# Patient Record
Sex: MALE | Race: WHITE | NOT HISPANIC OR LATINO | Employment: FULL TIME | URBAN - METROPOLITAN AREA
[De-identification: names, ages, dates, MRNs, and addresses within clinical notes are randomized per-mention and may not be internally consistent; named-entity substitution may affect disease eponyms.]

---

## 2022-11-28 ENCOUNTER — OFFICE VISIT (OUTPATIENT)
Dept: FAMILY MEDICINE | Facility: CLINIC | Age: 42
End: 2022-11-28
Payer: COMMERCIAL

## 2022-11-28 VITALS
BODY MASS INDEX: 19.59 KG/M2 | HEIGHT: 73 IN | HEART RATE: 83 BPM | DIASTOLIC BLOOD PRESSURE: 75 MMHG | OXYGEN SATURATION: 98 % | TEMPERATURE: 98 F | WEIGHT: 147.81 LBS | RESPIRATION RATE: 15 BRPM | SYSTOLIC BLOOD PRESSURE: 110 MMHG

## 2022-11-28 DIAGNOSIS — Z76.89 ESTABLISHING CARE WITH NEW DOCTOR, ENCOUNTER FOR: Primary | ICD-10-CM

## 2022-11-28 DIAGNOSIS — F43.12 CHRONIC POST-TRAUMATIC STRESS DISORDER (PTSD): ICD-10-CM

## 2022-11-28 DIAGNOSIS — Z28.21 INFLUENZA VACCINATION DECLINED BY PATIENT: ICD-10-CM

## 2022-11-28 DIAGNOSIS — K92.1 HEMATOCHEZIA: ICD-10-CM

## 2022-11-28 DIAGNOSIS — J41.1 CHRONIC BRONCHITIS WITH PRODUCTIVE MUCOPURULENT COUGH: ICD-10-CM

## 2022-11-28 DIAGNOSIS — N52.2 DRUG-INDUCED ERECTILE DYSFUNCTION: ICD-10-CM

## 2022-11-28 DIAGNOSIS — Z00.00 ENCOUNTER FOR WELLNESS EXAMINATION: ICD-10-CM

## 2022-11-28 DIAGNOSIS — F33.9 MAJOR DEPRESSION, RECURRENT, CHRONIC: ICD-10-CM

## 2022-11-28 DIAGNOSIS — R63.4 UNINTENTIONAL WEIGHT LOSS: ICD-10-CM

## 2022-11-28 DIAGNOSIS — Z11.4 ENCOUNTER FOR SCREENING FOR HIV: ICD-10-CM

## 2022-11-28 DIAGNOSIS — R68.2 DRY MOUTH: ICD-10-CM

## 2022-11-28 DIAGNOSIS — F41.9 ANXIETY: ICD-10-CM

## 2022-11-28 DIAGNOSIS — R06.02 SHORTNESS OF BREATH: ICD-10-CM

## 2022-11-28 DIAGNOSIS — Z11.59 NEED FOR HEPATITIS C SCREENING TEST: ICD-10-CM

## 2022-11-28 PROCEDURE — 99204 PR OFFICE/OUTPT VISIT, NEW, LEVL IV, 45-59 MIN: ICD-10-PCS | Mod: ,,, | Performed by: FAMILY MEDICINE

## 2022-11-28 PROCEDURE — 99204 OFFICE O/P NEW MOD 45 MIN: CPT | Mod: ,,, | Performed by: FAMILY MEDICINE

## 2022-11-28 RX ORDER — MIRTAZAPINE 30 MG/1
30 TABLET, FILM COATED ORAL NIGHTLY
COMMUNITY
End: 2022-12-13

## 2022-11-28 RX ORDER — ALBUTEROL SULFATE 90 UG/1
2 AEROSOL, METERED RESPIRATORY (INHALATION) EVERY 6 HOURS PRN
Qty: 18 G | Refills: 6 | Status: SHIPPED | OUTPATIENT
Start: 2022-11-28 | End: 2024-01-11

## 2022-11-28 RX ORDER — DULOXETIN HYDROCHLORIDE 60 MG/1
60 CAPSULE, DELAYED RELEASE ORAL DAILY
COMMUNITY

## 2022-11-28 RX ORDER — CLONAZEPAM 0.5 MG/1
0.5 TABLET ORAL 2 TIMES DAILY PRN
COMMUNITY

## 2022-11-28 RX ORDER — DEXTROAMPHETAMINE SULFATE, DEXTROAMPHETAMINE SACCHARATE, AMPHETAMINE SULFATE AND AMPHETAMINE ASPARTATE 5; 5; 5; 5 MG/1; MG/1; MG/1; MG/1
40 CAPSULE, EXTENDED RELEASE ORAL DAILY
COMMUNITY
Start: 2022-11-04

## 2022-11-28 RX ORDER — BUSPIRONE HYDROCHLORIDE 15 MG/1
15 TABLET ORAL 2 TIMES DAILY
COMMUNITY
End: 2023-03-08

## 2022-11-28 RX ORDER — PRAZOSIN HYDROCHLORIDE 2 MG/1
CAPSULE ORAL DAILY PRN
COMMUNITY
End: 2022-12-13

## 2022-11-28 RX ORDER — FLUTICASONE FUROATE AND VILANTEROL 100; 25 UG/1; UG/1
1 POWDER RESPIRATORY (INHALATION) DAILY
Qty: 30 EACH | Refills: 6 | Status: SHIPPED | OUTPATIENT
Start: 2022-11-28 | End: 2023-06-19

## 2022-11-28 NOTE — ASSESSMENT & PLAN NOTE
Discuss side effect with your psychiatrist. Suck on salty/sour things to increase saliva production
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
## 2022-11-28 NOTE — ASSESSMENT & PLAN NOTE
cxr ordered  pft ordered  Will start rescule albuterol and daily inhaled steroid  rtc 4 wks or sooner if needed

## 2022-11-30 ENCOUNTER — HOSPITAL ENCOUNTER (OUTPATIENT)
Dept: RADIOLOGY | Facility: HOSPITAL | Age: 42
Discharge: HOME | End: 2022-11-30
Attending: FAMILY MEDICINE
Payer: COMMERCIAL

## 2022-11-30 DIAGNOSIS — R06.02 SHORTNESS OF BREATH: ICD-10-CM

## 2022-11-30 DIAGNOSIS — J41.1 CHRONIC BRONCHITIS WITH PRODUCTIVE MUCOPURULENT COUGH: ICD-10-CM

## 2022-11-30 PROCEDURE — 71046 X-RAY EXAM CHEST 2 VIEWS: CPT | Mod: TC

## 2022-12-01 ENCOUNTER — TELEPHONE (OUTPATIENT)
Dept: FAMILY MEDICINE | Facility: CLINIC | Age: 42
End: 2022-12-01
Payer: COMMERCIAL

## 2022-12-01 DIAGNOSIS — D70.9 NEUTROPENIA, UNSPECIFIED TYPE: Primary | ICD-10-CM

## 2022-12-02 ENCOUNTER — PATIENT MESSAGE (OUTPATIENT)
Dept: FAMILY MEDICINE | Facility: CLINIC | Age: 42
End: 2022-12-02
Payer: COMMERCIAL

## 2022-12-05 ENCOUNTER — OFFICE VISIT (OUTPATIENT)
Dept: FAMILY MEDICINE | Facility: CLINIC | Age: 42
End: 2022-12-05
Payer: COMMERCIAL

## 2022-12-05 ENCOUNTER — TELEPHONE (OUTPATIENT)
Dept: FAMILY MEDICINE | Facility: CLINIC | Age: 42
End: 2022-12-05

## 2022-12-05 VITALS — BODY MASS INDEX: 19.88 KG/M2 | HEIGHT: 73 IN | WEIGHT: 150 LBS

## 2022-12-05 DIAGNOSIS — R06.02 SHORTNESS OF BREATH: ICD-10-CM

## 2022-12-05 DIAGNOSIS — R74.01 TRANSAMINITIS: ICD-10-CM

## 2022-12-05 DIAGNOSIS — D70.8 OTHER NEUTROPENIA: Primary | ICD-10-CM

## 2022-12-05 DIAGNOSIS — F41.9 ANXIETY: ICD-10-CM

## 2022-12-05 PROCEDURE — 99214 PR OFFICE/OUTPT VISIT, EST, LEVL IV, 30-39 MIN: ICD-10-PCS | Mod: 95,,, | Performed by: FAMILY MEDICINE

## 2022-12-05 PROCEDURE — 99214 OFFICE O/P EST MOD 30 MIN: CPT | Mod: 95,,, | Performed by: FAMILY MEDICINE

## 2022-12-05 NOTE — TELEPHONE ENCOUNTER
Please call [facility] scheduling and let them know patient has not been called about scheduling his Pft. Thanks

## 2022-12-05 NOTE — PROGRESS NOTES
The patient location is: home  The chief complaint leading to consultation is: review labs    Visit type: audiovisual    Face to Face time with patient: 15  30 minutes of total time spent on the encounter, which includes face to face time and non-face to face time preparing to see the patient (eg, review of tests), Obtaining and/or reviewing separately obtained history, Documenting clinical information in the electronic or other health record, Independently interpreting results (not separately reported) and communicating results to the patient/family/caregiver, or Care coordination (not separately reported).         Each patient to whom he or she provides medical services by telemedicine is:  (1) informed of the relationship between the physician and patient and the respective role of any other health care provider with respect to management of the patient; and (2) notified that he or she may decline to receive medical services by telemedicine and may withdraw from such care at any time.    Notes:     Aman Cortes is a 42 y.o. male who presents for lab results discussion. CXR normal, but he has transaminitis and neutropenia. Hep panel and HIV negative. Peripheral smear just shows leukopenia with neutropenia. Of note, patient is on Remeron which can cause neutropenia. He does see psych for Depression, Anxiety and ptsd. Patient is a former alcoholic who drank heavily for 10 years. He drank a fifth of liquor daily. He has been sober for 5 years. He has had unintentional weight loss with rectal bleeding. He has been referred to gi for bleeding with weight loss.     SOB: SOB has improved significantly with breo. He has  PFT ordered and we will call with those results.     Anxiety/depression: patient sees psych. He has started weaning himself off of klonopin. We did discuss other alternatives such as vistaril. I did advise patient to discuss with psychiatrist about stopping remeron due to neutropenia as this can be a  likely cause.       There were no vitals filed for this visit.    Problem List Items Addressed This Visit          Psychiatric    Anxiety    Overview     Takes klonopin, sees psych         Current Assessment & Plan     Patient has started weaning himself off. I did discuss a very slow taper process with him. He has a psych appt next month.             Pulmonary    Shortness of breath    Overview     Albuterol and breo are helping          Current Assessment & Plan     Continue inhalers  pft ordered            Oncology    Other neutropenia - Primary    Current Assessment & Plan     rtc 3 mts with labs  Additional labs ordered now  Stop remeron as this can cause neutropenia         Relevant Orders    CBC Auto Differential    Copper, Serum    Folate    Vitamin B12    Rheumatoid Factors, IgA, IgG, IgM       GI    Transaminitis    Overview     Has a history of alcoholism, but has been sober for 5 years. Does not take tylenol.          Current Assessment & Plan     US abd ordered         Relevant Orders    US Abdomen Limited

## 2022-12-05 NOTE — ASSESSMENT & PLAN NOTE
Patient has started weaning himself off. I did discuss a very slow taper process with him. He has a psych appt next month.

## 2022-12-05 NOTE — TELEPHONE ENCOUNTER
I spoke with Mohan at central scheduling. She said she will call the patient and get the PFT scheduled with him.

## 2022-12-13 ENCOUNTER — OFFICE VISIT (OUTPATIENT)
Dept: FAMILY MEDICINE | Facility: CLINIC | Age: 42
End: 2022-12-13
Payer: COMMERCIAL

## 2022-12-13 VITALS
HEIGHT: 73 IN | OXYGEN SATURATION: 96 % | DIASTOLIC BLOOD PRESSURE: 60 MMHG | BODY MASS INDEX: 19.04 KG/M2 | HEART RATE: 85 BPM | TEMPERATURE: 98 F | RESPIRATION RATE: 16 BRPM | WEIGHT: 143.63 LBS | SYSTOLIC BLOOD PRESSURE: 115 MMHG

## 2022-12-13 DIAGNOSIS — Z28.21 INFLUENZA VACCINATION DECLINED BY PATIENT: ICD-10-CM

## 2022-12-13 DIAGNOSIS — F41.9 ANXIETY: ICD-10-CM

## 2022-12-13 DIAGNOSIS — F43.12 CHRONIC POST-TRAUMATIC STRESS DISORDER (PTSD): ICD-10-CM

## 2022-12-13 DIAGNOSIS — F90.0 ATTENTION DEFICIT HYPERACTIVITY DISORDER (ADHD), PREDOMINANTLY INATTENTIVE TYPE: ICD-10-CM

## 2022-12-13 DIAGNOSIS — F33.9 MAJOR DEPRESSION, RECURRENT, CHRONIC: ICD-10-CM

## 2022-12-13 DIAGNOSIS — Z00.00 WELLNESS EXAMINATION: Primary | ICD-10-CM

## 2022-12-13 DIAGNOSIS — Z13.220 NEED FOR LIPID SCREENING: ICD-10-CM

## 2022-12-13 DIAGNOSIS — Z28.21 COVID-19 VACCINATION DECLINED: ICD-10-CM

## 2022-12-13 DIAGNOSIS — K92.1 HEMATOCHEZIA: ICD-10-CM

## 2022-12-13 PROCEDURE — 99396 PREV VISIT EST AGE 40-64: CPT | Mod: ,,, | Performed by: FAMILY MEDICINE

## 2022-12-13 PROCEDURE — 99396 PR PREVENTIVE VISIT,EST,40-64: ICD-10-PCS | Mod: ,,, | Performed by: FAMILY MEDICINE
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
## 2023-01-03 ENCOUNTER — HOSPITAL ENCOUNTER (OUTPATIENT)
Dept: RADIOLOGY | Facility: HOSPITAL | Age: 43
Discharge: HOME | End: 2023-01-03
Attending: FAMILY MEDICINE
Payer: COMMERCIAL

## 2023-01-03 DIAGNOSIS — R74.01 TRANSAMINITIS: Primary | ICD-10-CM

## 2023-01-03 DIAGNOSIS — R74.01 TRANSAMINITIS: ICD-10-CM

## 2023-01-03 PROCEDURE — 76705 ECHO EXAM OF ABDOMEN: CPT | Mod: TC

## 2023-01-03 NOTE — PROGRESS NOTES
I spoke with the patient and gave him the results. He said he will go get the labs done soon. He voiced understanding about the results.

## 2023-01-03 NOTE — PROGRESS NOTES
US abdomen is normal. NO evidence of cirrhosis. He does have labs to do for elevated liver enzymes. If he can do them, that would be great. Otherwise, It could be from history of alcohol use. We will call with lab results once done.

## 2023-01-26 ENCOUNTER — LAB REQUISITION (OUTPATIENT)
Dept: LAB | Facility: HOSPITAL | Age: 43
End: 2023-01-26
Payer: COMMERCIAL

## 2023-01-26 DIAGNOSIS — R05.3 CHRONIC COUGH: ICD-10-CM

## 2023-01-26 DIAGNOSIS — R19.7 DIARRHEA, UNSPECIFIED: ICD-10-CM

## 2023-01-26 DIAGNOSIS — F43.10 POST-TRAUMATIC STRESS DISORDER, UNSPECIFIED: ICD-10-CM

## 2023-01-26 DIAGNOSIS — Z80.0 FAMILY HISTORY OF MALIGNANT NEOPLASM OF DIGESTIVE ORGANS: ICD-10-CM

## 2023-01-26 DIAGNOSIS — K52.29 OTHER ALLERGIC AND DIETETIC GASTROENTERITIS AND COLITIS: ICD-10-CM

## 2023-01-26 DIAGNOSIS — F90.9 ATTENTION-DEFICIT HYPERACTIVITY DISORDER, UNSPECIFIED TYPE: ICD-10-CM

## 2023-01-26 DIAGNOSIS — R63.4 ABNORMAL WEIGHT LOSS: ICD-10-CM

## 2023-01-26 DIAGNOSIS — K92.1 MELENA: ICD-10-CM

## 2023-01-26 LAB
AMYLASE SERPL-CCNC: 59 UNIT/L (ref 25–125)
CRC RECOMMENDATION EXT: NORMAL
HIV 1+2 AB+HIV1 P24 AG SERPL QL IA: NONREACTIVE
IGA SERPL-MCNC: 178 MG/DL (ref 63–484)
LIPASE SERPL-CCNC: 23 U/L
TSH SERPL-ACNC: 2.45 UIU/ML (ref 0.35–4.94)

## 2023-01-26 PROCEDURE — 86003 ALLG SPEC IGE CRUDE XTRC EA: CPT | Performed by: INTERNAL MEDICINE

## 2023-01-26 PROCEDURE — 87389 HIV-1 AG W/HIV-1&-2 AB AG IA: CPT | Performed by: INTERNAL MEDICINE

## 2023-01-26 PROCEDURE — 86364 TISS TRNSGLTMNASE EA IG CLAS: CPT | Performed by: INTERNAL MEDICINE

## 2023-01-26 PROCEDURE — 82150 ASSAY OF AMYLASE: CPT | Performed by: INTERNAL MEDICINE

## 2023-01-26 PROCEDURE — 84443 ASSAY THYROID STIM HORMONE: CPT | Performed by: INTERNAL MEDICINE

## 2023-01-26 PROCEDURE — 82784 ASSAY IGA/IGD/IGG/IGM EACH: CPT | Performed by: INTERNAL MEDICINE

## 2023-01-26 PROCEDURE — 83690 ASSAY OF LIPASE: CPT | Performed by: INTERNAL MEDICINE

## 2023-01-26 PROCEDURE — 86480 TB TEST CELL IMMUN MEASURE: CPT | Performed by: INTERNAL MEDICINE

## 2023-01-27 LAB — TTG IGA SER IA-ACNC: 2.2 U/ML

## 2023-01-29 LAB
GAMMA INTERFERON BACKGROUND BLD IA-ACNC: 0.02 IU/ML
M TB IFN-G BLD-IMP: NEGATIVE
M TB IFN-G CD4+ BCKGRND COR BLD-ACNC: 0.02 IU/ML
M TB IFN-G CD4+CD8+ BCKGRND COR BLD-ACNC: 0 IU/ML
MITOGEN IGNF BCKGRD COR BLD-ACNC: 9.98 IU/ML

## 2023-01-30 ENCOUNTER — DOCUMENTATION ONLY (OUTPATIENT)
Dept: FAMILY MEDICINE | Facility: CLINIC | Age: 43
End: 2023-01-30
Payer: COMMERCIAL

## 2023-02-01 LAB
ALLERGEN ALMOND IGE (OLG): <0.1
ALLERGEN CASHEW NUT IGE (OLG): <0.1
ALLERGEN CODFISH IGE (OLG): <0.1
ALLERGEN CRAB IGE (OLG): <0.1
ALLERGEN EGG WHITE IGE (OLG): <0.1
ALLERGEN EGG YOLK IGE (OLG): <0.1
ALLERGEN HAZELNUT IGE (OLG): <0.1
ALLERGEN MILK IGE (OLG): 0.11
ALLERGEN PEANUT IGE (OLG): 0.12
ALLERGEN SALMON IGE (OLG): <0.1
ALLERGEN SCALLOP IGE (OLG): <0.1
ALLERGEN SESAME SEED IGE (OLG): 0.12
ALLERGEN SHRIMP IGE (OLG): <0.1
ALLERGEN SOY BEAN IGE (OLG): <0.1
ALLERGEN TUNA IGE (OLG): <0.1
ALLERGEN WALNUT IGE (OLG): <0.1
ALLERGEN WHEAT IGE (OLG): 0.15
PHADIOTOP IGE QN: 75.2 KU/L

## 2023-02-07 LAB
ALLERGEN MILK NBOS D 4 IGE (OLG): <0.1
ALLERGEN MILK NBOS D 5 IGE (OLG): 0.16
ALLERGEN MILK NBOS D 8 IGE (OLG): <0.1
ALLERGEN PEANUT RARA H 1 IGE (OLG): <0.1
ALLERGEN PEANUT RARA H 2 IGE (OLG): <0.1
ALLERGEN PEANUT RARA H 3 IGE (OLG): <0.1
ALLERGEN PEANUT RARA H 6 IGE (OLG): <0.1
ALLERGEN PEANUT RARA H 8 IGE (OLG): <0.1
ALLERGEN PEANUT RARA H 9 IGE (OLG): <0.1

## 2023-03-06 RX ORDER — SOD SULF/POT CHLORIDE/MAG SULF 1.479 G
TABLET ORAL
COMMUNITY
Start: 2023-01-03 | End: 2023-03-08

## 2023-03-06 RX ORDER — BUSPIRONE HYDROCHLORIDE 10 MG/1
TABLET ORAL
COMMUNITY
End: 2023-03-08

## 2023-03-08 ENCOUNTER — TELEPHONE (OUTPATIENT)
Dept: FAMILY MEDICINE | Facility: CLINIC | Age: 43
End: 2023-03-08

## 2023-03-08 ENCOUNTER — OFFICE VISIT (OUTPATIENT)
Dept: FAMILY MEDICINE | Facility: CLINIC | Age: 43
End: 2023-03-08
Payer: COMMERCIAL

## 2023-03-08 VITALS
TEMPERATURE: 98 F | BODY MASS INDEX: 20.15 KG/M2 | HEIGHT: 73 IN | RESPIRATION RATE: 16 BRPM | DIASTOLIC BLOOD PRESSURE: 65 MMHG | HEART RATE: 81 BPM | OXYGEN SATURATION: 100 % | WEIGHT: 152 LBS | SYSTOLIC BLOOD PRESSURE: 110 MMHG

## 2023-03-08 DIAGNOSIS — K92.1 HEMATOCHEZIA: ICD-10-CM

## 2023-03-08 DIAGNOSIS — Z00.00 WELLNESS EXAMINATION: ICD-10-CM

## 2023-03-08 DIAGNOSIS — F90.0 ATTENTION DEFICIT HYPERACTIVITY DISORDER (ADHD), PREDOMINANTLY INATTENTIVE TYPE: ICD-10-CM

## 2023-03-08 DIAGNOSIS — R74.01 TRANSAMINITIS: ICD-10-CM

## 2023-03-08 DIAGNOSIS — F41.9 ANXIETY: ICD-10-CM

## 2023-03-08 DIAGNOSIS — F43.12 CHRONIC POST-TRAUMATIC STRESS DISORDER (PTSD): ICD-10-CM

## 2023-03-08 DIAGNOSIS — J41.1 CHRONIC BRONCHITIS WITH PRODUCTIVE MUCOPURULENT COUGH: ICD-10-CM

## 2023-03-08 DIAGNOSIS — D70.8 OTHER NEUTROPENIA: ICD-10-CM

## 2023-03-08 DIAGNOSIS — F33.9 MAJOR DEPRESSION, RECURRENT, CHRONIC: Primary | ICD-10-CM

## 2023-03-08 LAB
ALBUMIN SERPL-MCNC: 3.9 G/DL (ref 3.5–5)
ALBUMIN/GLOB SERPL: 1.6 RATIO (ref 1.1–2)
ALP SERPL-CCNC: 56 UNIT/L (ref 40–150)
ALT SERPL-CCNC: 26 UNIT/L (ref 0–55)
AST SERPL-CCNC: 20 UNIT/L (ref 5–34)
BASOPHILS # BLD AUTO: 0.02 X10(3)/MCL (ref 0–0.2)
BASOPHILS NFR BLD AUTO: 0.5 %
BILIRUBIN DIRECT+TOT PNL SERPL-MCNC: 0.6 MG/DL
BUN SERPL-MCNC: 15.8 MG/DL (ref 8.9–20.6)
CALCIUM SERPL-MCNC: 9.3 MG/DL (ref 8.4–10.2)
CHLORIDE SERPL-SCNC: 106 MMOL/L (ref 98–107)
CO2 SERPL-SCNC: 27 MMOL/L (ref 22–29)
CREAT SERPL-MCNC: 1.29 MG/DL (ref 0.73–1.18)
EOSINOPHIL # BLD AUTO: 0.07 X10(3)/MCL (ref 0–0.9)
EOSINOPHIL NFR BLD AUTO: 1.8 %
ERYTHROCYTE [DISTWIDTH] IN BLOOD BY AUTOMATED COUNT: 12.9 % (ref 11.5–17)
FOLATE SERPL-MCNC: 14.9 NG/ML (ref 7–31.4)
GFR SERPLBLD CREATININE-BSD FMLA CKD-EPI: >60 MLS/MIN/1.73/M2
GLOBULIN SER-MCNC: 2.5 GM/DL (ref 2.4–3.5)
GLUCOSE SERPL-MCNC: 70 MG/DL (ref 74–100)
HCT VFR BLD AUTO: 43.9 % (ref 42–52)
HGB BLD-MCNC: 14.6 G/DL (ref 14–18)
IMM GRANULOCYTES # BLD AUTO: 0.01 X10(3)/MCL (ref 0–0.04)
IMM GRANULOCYTES NFR BLD AUTO: 0.3 %
LYMPHOCYTES # BLD AUTO: 1.01 X10(3)/MCL (ref 0.6–4.6)
LYMPHOCYTES NFR BLD AUTO: 26.4 %
MCH RBC QN AUTO: 30.2 PG
MCHC RBC AUTO-ENTMCNC: 33.3 G/DL (ref 33–36)
MCV RBC AUTO: 90.9 FL (ref 80–94)
MONOCYTES # BLD AUTO: 0.28 X10(3)/MCL (ref 0.1–1.3)
MONOCYTES NFR BLD AUTO: 7.3 %
NEUTROPHILS # BLD AUTO: 2.43 X10(3)/MCL (ref 2.1–9.2)
NEUTROPHILS NFR BLD AUTO: 63.7 %
NRBC BLD AUTO-RTO: 0 %
PLATELET # BLD AUTO: 229 X10(3)/MCL (ref 130–400)
PMV BLD AUTO: 8.9 FL (ref 7.4–10.4)
POTASSIUM SERPL-SCNC: 4.4 MMOL/L (ref 3.5–5.1)
PROT SERPL-MCNC: 6.4 GM/DL (ref 6.4–8.3)
RBC # BLD AUTO: 4.83 X10(6)/MCL (ref 4.7–6.1)
SODIUM SERPL-SCNC: 140 MMOL/L (ref 136–145)
VIT B12 SERPL-MCNC: 684 PG/ML (ref 213–816)
WBC # SPEC AUTO: 3.8 X10(3)/MCL (ref 4.5–11.5)

## 2023-03-08 PROCEDURE — 99214 OFFICE O/P EST MOD 30 MIN: CPT | Mod: ,,, | Performed by: FAMILY MEDICINE

## 2023-03-08 PROCEDURE — 85025 COMPLETE CBC W/AUTO DIFF WBC: CPT | Performed by: FAMILY MEDICINE

## 2023-03-08 PROCEDURE — 99214 PR OFFICE/OUTPT VISIT, EST, LEVL IV, 30-39 MIN: ICD-10-PCS | Mod: ,,, | Performed by: FAMILY MEDICINE

## 2023-03-08 PROCEDURE — 36415 COLL VENOUS BLD VENIPUNCTURE: CPT | Mod: ,,, | Performed by: FAMILY MEDICINE

## 2023-03-08 PROCEDURE — 82607 VITAMIN B-12: CPT | Performed by: FAMILY MEDICINE

## 2023-03-08 PROCEDURE — 36415 COLL VENOUS BLD VENIPUNCTURE: CPT | Performed by: FAMILY MEDICINE

## 2023-03-08 PROCEDURE — 36415 PR COLLECTION VENOUS BLOOD,VENIPUNCTURE: ICD-10-PCS | Mod: ,,, | Performed by: FAMILY MEDICINE

## 2023-03-08 PROCEDURE — 80053 COMPREHEN METABOLIC PANEL: CPT | Performed by: FAMILY MEDICINE

## 2023-03-08 PROCEDURE — 82746 ASSAY OF FOLIC ACID SERUM: CPT | Performed by: FAMILY MEDICINE

## 2023-03-08 NOTE — TELEPHONE ENCOUNTER
Called to give the patient his results. No answer. I left a message to call us back with our number.

## 2023-03-08 NOTE — PROGRESS NOTES
His wbcs are increasing but not yet normal. I think it was a medication side effect. I will repeat his labs right before his wellness at the end of the year. If he starts having any odd symptoms like weight loss, night sweats, decreased appetite, lethargy, recurrent infections then he should come in sooner.

## 2023-03-08 NOTE — TELEPHONE ENCOUNTER
----- Message from Ceci May sent at 3/8/2023  4:11 PM CST -----  .Type:  Patient Returning Call    Who Called:pt  Who Left Message for Patient:Paulino  Does the patient know what this is regarding?:lab results  Would the patient rather a call back or a response via [facility]? Call back  Best Call Back Number:[phone]  Additional Information: pt is requesting a call back
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
## 2023-03-08 NOTE — TELEPHONE ENCOUNTER
----- Message from Olesya Mendieta MD sent at 3/8/2023  1:36 PM CST -----  His wbcs are increasing but not yet normal. I think it was a medication side effect. I will repeat his labs right before his wellness at the end of the year. If he starts having any odd symptoms like weight loss, night sweats, decreased appetite, lethargy, recurrent infections then he should come in sooner.

## 2023-03-09 ENCOUNTER — TELEPHONE (OUTPATIENT)
Dept: FAMILY MEDICINE | Facility: CLINIC | Age: 43
End: 2023-03-09
Payer: COMMERCIAL

## 2023-07-27 ENCOUNTER — TELEPHONE (OUTPATIENT)
Dept: FAMILY MEDICINE | Facility: CLINIC | Age: 43
End: 2023-07-27
Payer: COMMERCIAL

## 2023-07-27 NOTE — TELEPHONE ENCOUNTER
----- Message from [facility] sent at 7/27/2023  3:11 PM CDT -----  .Type:  Needs Medical Advice    Who Called:  pt    Symptoms (please be specific):  mild, nervous     How long has patient had these symptoms:   2 weeks     Pharmacy name and phone #:   [facility] in [place]    Would the patient rather a call back? Yes    Best Call Back Number:  [phone]    Additional Information:  pt wants to discuss his work issues and his Post Dramatic Stress Disorder

## 2023-07-27 NOTE — TELEPHONE ENCOUNTER
Pt states that he needs to speak to Dr Lilly herself about his work scheduled, medications and needing a letter for work. I advised him that he should schedule an appt. Verbal understanding given. Pt was transferred to the from office to schedule the appt

## 2023-08-02 ENCOUNTER — OFFICE VISIT (OUTPATIENT)
Dept: FAMILY MEDICINE | Facility: CLINIC | Age: 43
End: 2023-08-02
Payer: COMMERCIAL

## 2023-08-02 VITALS
TEMPERATURE: 98 F | HEIGHT: 73 IN | RESPIRATION RATE: 18 BRPM | WEIGHT: 156.69 LBS | HEART RATE: 67 BPM | BODY MASS INDEX: 20.77 KG/M2 | SYSTOLIC BLOOD PRESSURE: 124 MMHG | DIASTOLIC BLOOD PRESSURE: 81 MMHG | OXYGEN SATURATION: 98 %

## 2023-08-02 DIAGNOSIS — F33.9 MAJOR DEPRESSION, RECURRENT, CHRONIC: Primary | ICD-10-CM

## 2023-08-02 DIAGNOSIS — J41.1 CHRONIC BRONCHITIS WITH PRODUCTIVE MUCOPURULENT COUGH: ICD-10-CM

## 2023-08-02 DIAGNOSIS — R07.81 RIB PAIN ON LEFT SIDE: ICD-10-CM

## 2023-08-02 DIAGNOSIS — F43.12 CHRONIC POST-TRAUMATIC STRESS DISORDER (PTSD): ICD-10-CM

## 2023-08-02 PROCEDURE — 99214 PR OFFICE/OUTPT VISIT, EST, LEVL IV, 30-39 MIN: ICD-10-PCS | Mod: ,,, | Performed by: FAMILY MEDICINE

## 2023-08-02 PROCEDURE — 99214 OFFICE O/P EST MOD 30 MIN: CPT | Mod: ,,, | Performed by: FAMILY MEDICINE

## 2023-08-02 RX ORDER — FLUTICASONE FUROATE AND VILANTEROL 100; 25 UG/1; UG/1
1 POWDER RESPIRATORY (INHALATION) DAILY
Qty: 90 EACH | Refills: 3 | Status: SHIPPED | OUTPATIENT
Start: 2023-08-02 | End: 2024-08-01

## 2023-08-02 RX ORDER — HYDROCODONE BITARTRATE AND ACETAMINOPHEN 5; 325 MG/1; MG/1
1 TABLET ORAL EVERY 6 HOURS PRN
COMMUNITY
Start: 2023-07-15

## 2023-08-02 NOTE — LETTER
August 2, 2023      Family Medicine  [address] [place], [place] [address] [address]  Phone: [phone]  Fax: [phone]       Patient: Aman Cortes   YOB: 1980  Date of Visit: 08/02/2023    To Whom It May Concern:    Jennifer Cortes  was at [facility] on 08/02/2023. Due to ongoing medical conditions, it is recommended that patient be changed to working strictly day shifts.  If you have any questions or concerns, or if I can be of further assistance, please do not hesitate to contact me.    Sincerely,    Olesya Mendieta MD
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
## 2024-01-11 ENCOUNTER — TELEPHONE (OUTPATIENT)
Dept: FAMILY MEDICINE | Facility: CLINIC | Age: 44
End: 2024-01-11

## 2024-01-11 ENCOUNTER — OFFICE VISIT (OUTPATIENT)
Dept: FAMILY MEDICINE | Facility: CLINIC | Age: 44
End: 2024-01-11
Payer: COMMERCIAL

## 2024-01-11 VITALS
OXYGEN SATURATION: 97 % | TEMPERATURE: 99 F | BODY MASS INDEX: 19.09 KG/M2 | RESPIRATION RATE: 20 BRPM | HEIGHT: 73 IN | DIASTOLIC BLOOD PRESSURE: 66 MMHG | SYSTOLIC BLOOD PRESSURE: 95 MMHG | WEIGHT: 144 LBS | HEART RATE: 102 BPM

## 2024-01-11 DIAGNOSIS — Z13.220 ENCOUNTER FOR LIPID SCREENING FOR CARDIOVASCULAR DISEASE: ICD-10-CM

## 2024-01-11 DIAGNOSIS — Z00.00 ENCOUNTER FOR WELLNESS EXAMINATION: Primary | ICD-10-CM

## 2024-01-11 DIAGNOSIS — Z13.6 ENCOUNTER FOR LIPID SCREENING FOR CARDIOVASCULAR DISEASE: ICD-10-CM

## 2024-01-11 DIAGNOSIS — R68.89 FLU-LIKE SYMPTOMS: ICD-10-CM

## 2024-01-11 PROCEDURE — 99213 OFFICE O/P EST LOW 20 MIN: CPT | Mod: ,,, | Performed by: FAMILY MEDICINE

## 2024-01-11 RX ORDER — OSELTAMIVIR PHOSPHATE 75 MG/1
75 CAPSULE ORAL 2 TIMES DAILY
Qty: 10 CAPSULE | Refills: 0 | Status: SHIPPED | OUTPATIENT
Start: 2024-01-11 | End: 2024-01-16

## 2024-01-11 RX ORDER — ONDANSETRON 8 MG/1
8 TABLET, ORALLY DISINTEGRATING ORAL 2 TIMES DAILY
Qty: 14 TABLET | Refills: 0 | Status: SHIPPED | OUTPATIENT
Start: 2024-01-11 | End: 2024-01-18

## 2024-01-11 RX ORDER — BROMPHENIRAMINE MALEATE, PSEUDOEPHEDRINE HYDROCHLORIDE, AND DEXTROMETHORPHAN HYDROBROMIDE 2; 30; 10 MG/5ML; MG/5ML; MG/5ML
5 SYRUP ORAL EVERY 8 HOURS PRN
Qty: 120 ML | Refills: 0 | Status: SHIPPED | OUTPATIENT
Start: 2024-01-11 | End: 2024-01-21

## 2024-01-11 NOTE — PROGRESS NOTES
Aman Cortes  01/11/2024  [id number]    Olesya Mendieta MD  Patient Care Team:  Olesya Mendieta MD as PCP - General (Family Medicine)      Chief Complaint:  Chief Complaint   Patient presents with    Influenza     Pt is c/o fever, body aches, nausea, head/chest congestion, headache. Symptoms started for 2 days       History of Present Illness:    43 y.o. male who presents today c/o 2 days of fever, congestion, headaches, productive cough, body aches and nausea. He does report some sob, wheezing and coughing. Using inhalers and has noticed some improvement. Patient's son has Flu B. Home covid test neg.     Review of Systems  Eye: denies blurred vision, changes in vision  Cardiovascular: denies chest pain, palpitations, edema  Gastrointestinal: denies abdominal pain, n/v, constipation, diarrhea  Integumentary: denies rashes, pruritis    Past Medical History  Past Medical History:   Diagnosis Date    ADHD (attention deficit hyperactivity disorder) 2021    Depression 2016    PTSD (post-traumatic stress disorder) 2016    Traumatic brain injury 2004       Medications  Medication List with Changes/Refills   New Medications    BROMPHENIRAMINE-PSEUDOEPH-DM (BROMFED DM) 2-30-10 MG/5 ML SYRP    Take 5 mLs by mouth every 8 (eight) hours as needed (cough).    ONDANSETRON (ZOFRAN-ODT) 8 MG TBDL    Take 1 tablet (8 mg total) by mouth 2 (two) times daily. for 7 days    OSELTAMIVIR (TAMIFLU) 75 MG CAPSULE    Take 1 capsule (75 mg total) by mouth 2 (two) times daily. for 5 days   Current Medications    ADDERALL XR 20 MG 24 HR CAPSULE    Take 40 mg by mouth Daily.    ALBUTEROL (VENTOLIN HFA) 90 MCG/ACTUATION INHALER    Inhale 2 puffs into the lungs every 6 (six) hours as needed for Wheezing. Rescue    CLONAZEPAM (KLONOPIN) 0.1 MG/ML SUSP ORAL SUSPENSION    clonazepam Take No date recorded No form recorded No frequency recorded No route recorded No set duration recorded No set duration amount recorded active No dosage  "strength recorded No dosage strength units of measure recorded    CLONAZEPAM (KLONOPIN) 0.5 MG TABLET    Take 0.5 mg by mouth 2 (two) times daily as needed for Anxiety.    DULOXETINE (CYMBALTA) 60 MG CAPSULE    Take 60 mg by mouth once daily.    FLUTICASONE FUROATE-VILANTEROL (BREO ELLIPTA) 100-25 MCG/DOSE DISKUS INHALER    Inhale 1 puff into the lungs once daily.    HYDROCODONE-ACETAMINOPHEN (NORCO) 5-325 MG PER TABLET    Take 1 tablet by mouth every 6 (six) hours as needed.    MEDICAL SUPPLY, MISCELLANEOUS (MISCELLANEOUS MEDICAL SUPPLY MISC)    by Misc.(Non-Drug; Combo Route) route once daily. Weight gain powder mix    MEDICAL SUPPLY, MISCELLANEOUS (MISCELLANEOUS MEDICAL SUPPLY MISC)    by Misc.(Non-Drug; Combo Route) route once daily. Multi vitamin    MELATONIN 10 MG CHEW    Take by mouth once daily.       Past Surgical History:   Procedure Laterality Date    VASECTOMY         SUBJECTIVE:  Health Maintenance  Health Maintenance Topics with due status: Not Due       Topic Last Completion Date    Lipid Panel 11/30/2022     Health Maintenance Due   Topic Date Due    COVID-19 Vaccine (1) Never done    Pneumococcal Vaccines (Age 0-64) (1 - PCV) Never done    TETANUS VACCINE  Never done    Influenza Vaccine (1) 09/01/2023       Exam:  Vitals:    01/11/24 1515   BP: 95/66   BP Location: Right arm   Patient Position: Sitting   BP Method: Large (Manual)   Pulse: 102   Resp: 20   Temp: 98.7 °F (37.1 °C)   TempSrc: Oral   SpO2: 97%   Weight: 65.3 kg (144 lb)   Height: 6' 1" (1.854 m)     Weight: 65.3 kg (144 lb)   Body mass index is 19 kg/m².      Physical Exam  Constitutional: NAD, alert, pleasant  Respiratory: CTAB, no wheezes, rales or rhonchi. No accessory muscle use  Eyes: EOMI  Cardiovascular: RRR, No m/r/g. No JVD. No LE edema  Integumentary: warm, dry, intact  Psych: AA&Ox3      ICD-10-CM ICD-9-CM   1. Encounter for wellness examination  Z00.00 V70.0   2. Encounter for lipid screening for cardiovascular disease  " Z13.220 V77.91    Z13.6 V81.2   3. Flu-like symptoms  R68.89 780.99       1. Encounter for wellness examination  -     CBC Auto Differential; Future; Expected date: 04/11/2024  -     Comprehensive Metabolic Panel; Future; Expected date: 04/11/2024  -     Lipid Panel; Future; Expected date: 04/11/2024  -     TSH; Future; Expected date: 04/11/2024  -     Urinalysis; Future; Expected date: 04/11/2024    2. Encounter for lipid screening for cardiovascular disease  -     Lipid Panel; Future; Expected date: 04/11/2024    3. Flu-like symptoms  -     ondansetron (ZOFRAN-ODT) 8 MG TbDL; Take 1 tablet (8 mg total) by mouth 2 (two) times daily. for 7 days  Dispense: 14 tablet; Refill: 0  -     oseltamivir (TAMIFLU) 75 MG capsule; Take 1 capsule (75 mg total) by mouth 2 (two) times daily. for 5 days  Dispense: 10 capsule; Refill: 0  -     brompheniramine-pseudoeph-DM (BROMFED DM) 2-30-10 mg/5 mL Syrp; Take 5 mLs by mouth every 8 (eight) hours as needed (cough).  Dispense: 120 mL; Refill: 0       Due to exposure to flu and flu like symptoms, I will send in tamiflu. Side effects of medication discussed  Will give zofran 8 mg odt for nausea and bromfed dm for cough  - stay well hydrated with gatorade  - take tylenol or motrin for fever, body aches and headache  - wash hands thoroughly with soap and water  - you may take otc meds for symptom relief  - go to ED if you have lethargy, shortness of breath, chest pain or wheezing   - I did offer patient a cxr for sob, but he declined at this time. Strict ER precautions given to patient    Follow up: Follow up for 3 mts wellness with labs.      Care Plan/Goals: Reviewed   Goals    None

## 2024-01-11 NOTE — TELEPHONE ENCOUNTER
----- Message from [facility] sent at 1/11/2024 10:59 AM CST -----  .Type:  Needs Medical Advice    Who Called:  pt    Symptoms (please be specific):  fever, nauseated, congested, covid negative and family has the flu he thinks he has it now     How long has patient had these symptoms:   2 days    Pharmacy name and phone #:   [facility] in [place]    Would the patient rather a call back or a response via [facility]?      Best Call Back Number: [phone]    Additional Information:  pt made an appt for tomorrow but was wondering if she can call something in thanks

## 2024-08-08 ENCOUNTER — TELEPHONE (OUTPATIENT)
Dept: FAMILY MEDICINE | Facility: CLINIC | Age: 44
End: 2024-08-08
Payer: COMMERCIAL

## 2025-06-04 ENCOUNTER — TELEPHONE (OUTPATIENT)
Dept: FAMILY MEDICINE | Facility: CLINIC | Age: 45
End: 2025-06-04

## 2025-06-04 NOTE — TELEPHONE ENCOUNTER
Patient hasn't been seen since 1.2024 and needs an appointment for any refills. Please schedule appointment. Joceline

## 2025-06-04 NOTE — TELEPHONE ENCOUNTER
Copied from CRM #[id number]. Topic: Medications - Medication Refill  >> Jun 4, 2025 12:36 PM Anayeli wrote:  Who Called: Aman Cortes    Refill or New Rx:Refill  RX Name and Strength:fluticasone furoate-vilanteroL (BREO ELLIPTA) 100-25 mcg/dose diskus inhaler 90 each  How is the patient currently taking it? (ex. 1XDay):1 xday  Is this a 30 day or 90 day RX:30 day     RX Name and Strength:falbuterol (VENTOLIN HFA) 90 mcg/actuation inhaler 18 g  How is the patient currently taking it? (ex. 1XDay):1 xday  Is this a 30 day or 90 day RX:30xday     Local or Mail Order:local   List of preferred pharmacies on file (remove unneeded): [unfilled]  If different Pharmacy is requested, enter Pharmacy information here including location and phone number: [id number] - [facility] - [phone]  AT [facility] [address]   Phone: [phone]  Fax: [phone]         Ordering Provider:( former vivek)      Preferred Method of Contact: Phone Call  Patient's Preferred Phone Number on File: [phone]   Best Call Back Number, if different:  Additional Information: refill request, pt called and stated pharmacy needs approval to refill, please advise. thanks